# Patient Record
Sex: MALE | Race: ASIAN | Employment: FULL TIME | URBAN - METROPOLITAN AREA
[De-identification: names, ages, dates, MRNs, and addresses within clinical notes are randomized per-mention and may not be internally consistent; named-entity substitution may affect disease eponyms.]

---

## 2018-02-16 ENCOUNTER — OFFICE VISIT (OUTPATIENT)
Dept: PRIMARY CARE CLINIC | Age: 38
End: 2018-02-16
Payer: COMMERCIAL

## 2018-02-16 VITALS
DIASTOLIC BLOOD PRESSURE: 70 MMHG | BODY MASS INDEX: 22.22 KG/M2 | TEMPERATURE: 102.8 F | RESPIRATION RATE: 14 BRPM | HEART RATE: 104 BPM | OXYGEN SATURATION: 94 % | HEIGHT: 69 IN | WEIGHT: 150 LBS | SYSTOLIC BLOOD PRESSURE: 116 MMHG

## 2018-02-16 DIAGNOSIS — R68.89 FLU-LIKE SYMPTOMS: ICD-10-CM

## 2018-02-16 DIAGNOSIS — J01.00 ACUTE NON-RECURRENT MAXILLARY SINUSITIS: Primary | ICD-10-CM

## 2018-02-16 LAB
INFLUENZA A ANTIBODY: POSITIVE
INFLUENZA B ANTIBODY: NEGATIVE

## 2018-02-16 PROCEDURE — 99214 OFFICE O/P EST MOD 30 MIN: CPT | Performed by: INTERNAL MEDICINE

## 2018-02-16 PROCEDURE — 87804 INFLUENZA ASSAY W/OPTIC: CPT | Performed by: INTERNAL MEDICINE

## 2018-02-16 RX ORDER — OSELTAMIVIR PHOSPHATE 75 MG/1
75 CAPSULE ORAL 2 TIMES DAILY
Qty: 10 CAPSULE | Refills: 0 | Status: SHIPPED | OUTPATIENT
Start: 2018-02-16 | End: 2018-02-21

## 2018-02-16 RX ORDER — AZITHROMYCIN 250 MG/1
TABLET, FILM COATED ORAL
Qty: 1 PACKET | Refills: 0 | Status: SHIPPED | OUTPATIENT
Start: 2018-02-16 | End: 2019-12-20 | Stop reason: ALTCHOICE

## 2018-02-16 ASSESSMENT — ENCOUNTER SYMPTOMS
EYE REDNESS: 0
BACK PAIN: 0
EYE DISCHARGE: 0
TROUBLE SWALLOWING: 0

## 2018-02-16 ASSESSMENT — PATIENT HEALTH QUESTIONNAIRE - PHQ9
2. FEELING DOWN, DEPRESSED OR HOPELESS: 0
SUM OF ALL RESPONSES TO PHQ9 QUESTIONS 1 & 2: 0
SUM OF ALL RESPONSES TO PHQ QUESTIONS 1-9: 0
1. LITTLE INTEREST OR PLEASURE IN DOING THINGS: 0

## 2018-02-16 NOTE — PROGRESS NOTES
[facility] PHYSICIANS  [facility] INTERNAL MEDICINE  [address]  [address] [facility] [address]  Dept: [phone]  Dept Fax: [phone]    Isabel Gao is a 40 y.o. male who presents today for his medical conditions/complaints as noted below. Isabel Gao is c/o of   Chief Complaint   Patient presents with    URI     X2 days. Flu test ordred    Fatigue     x2 days    Fever     x2 days    Sinusitis    Cough    Influenza     + A   -B    SICK VISIT   He is here for acute onset sickness    HPI:     HPI   No PCP in past     No chronic medical problems. Possible sick contact in recent time at office   Pt c/o sinusitis symptoms like sinus pressure , runny nose since last 2 days  associated with  mild fever/chills , sore throat , cough , ear pain , headache . Tried OTC meds with partial relief . Nyquil     Flu test positive for influenza A         BP Readings from Last 3 Encounters:   02/16/18 116/70          (goal 120/80)    History reviewed. No pertinent past medical history. History reviewed. No pertinent surgical history. History reviewed. No pertinent family history. Social History   Substance Use Topics    Smoking status: Never Smoker    Smokeless tobacco: Never Used    Alcohol use No      Current Outpatient Prescriptions   Medication Sig Dispense Refill    azithromycin (ZITHROMAX) 250 MG tablet Take 2 tabs (500 mg) on Day 1, and take 1 tab (250 mg) on days 2 through 5. 1 packet 0    oseltamivir (TAMIFLU) 75 MG capsule Take 1 capsule by mouth 2 times daily for 10 doses 10 capsule 0     No current facility-administered medications for this visit.       No Known Allergies    Health Maintenance   Topic Date Due    HIV screen  08/17/1996    Flu vaccine (1) 11/01/2018 (Originally 9/1/2017)    DTaP/Tdap/Td vaccine (1 - Tdap) 02/26/2019 (Originally 8/17/2000)       Subjective:      Review of Systems   Constitutional: Negative for activity change, appetite change and unexpected weight change. HENT: Negative for trouble swallowing. Eyes: Negative for discharge and redness. Cardiovascular: Negative for palpitations. Endocrine: Negative for cold intolerance and heat intolerance. Genitourinary: Negative for difficulty urinating. Musculoskeletal: Negative for back pain. Skin: Negative for wound. Neurological: Negative for dizziness. Hematological: Negative for adenopathy. Psychiatric/Behavioral: Negative for behavioral problems. The patient is not nervous/anxious. Objective:     Physical Exam   Constitutional: He is oriented to person, place, and time. He appears well-developed and well-nourished. No distress. HENT:   Head: Normocephalic and atraumatic. Right Ear: External ear normal.   Left Ear: External ear normal.   Nose: Nose normal.   Mouth/Throat: Oropharynx is clear and moist. No oropharyngeal exudate. Maxillary sinus tenderness +   Eyes: Conjunctivae are normal. Right eye exhibits no discharge. Left eye exhibits no discharge. No scleral icterus. Neck: Neck supple. Cardiovascular: Normal rate, regular rhythm and normal heart sounds. No murmur heard. Pulmonary/Chest: Effort normal and breath sounds normal. No respiratory distress. He has no wheezes. Abdominal: Soft. Bowel sounds are normal. He exhibits no distension. There is no tenderness. Musculoskeletal: He exhibits no edema or tenderness. Lymphadenopathy:     He has no cervical adenopathy. Neurological: He is alert and oriented to person, place, and time. Skin: Skin is warm and dry. No rash noted. He is not diaphoretic. Psychiatric: Judgment and thought content normal.   Nursing note and vitals reviewed. /70 (Site: Right Arm, Position: Sitting, Cuff Size: Medium Adult)   Pulse 104   Temp 102.8 °F (39.3 °C)   Resp 14   Ht 5' 9\" (1.753 m)   Wt 150 lb (68 kg)   SpO2 94%   BMI 22.15 kg/m²     Assessment:      1.  Acute non-recurrent maxillary sinusitis  azithromycin (ZITHROMAX)

## 2019-12-20 ENCOUNTER — HOSPITAL ENCOUNTER (OUTPATIENT)
Age: 39
Setting detail: SPECIMEN
Discharge: HOME | End: 2019-12-20
Payer: COMMERCIAL

## 2019-12-20 ENCOUNTER — OFFICE VISIT (OUTPATIENT)
Dept: PRIMARY CARE CLINIC | Age: 39
End: 2019-12-20
Payer: COMMERCIAL

## 2019-12-20 VITALS
OXYGEN SATURATION: 98 % | HEIGHT: 69 IN | HEART RATE: 64 BPM | BODY MASS INDEX: 24.35 KG/M2 | RESPIRATION RATE: 16 BRPM | DIASTOLIC BLOOD PRESSURE: 70 MMHG | WEIGHT: 164.4 LBS | SYSTOLIC BLOOD PRESSURE: 118 MMHG

## 2019-12-20 DIAGNOSIS — Z23 ENCOUNTER FOR IMMUNIZATION: ICD-10-CM

## 2019-12-20 DIAGNOSIS — Z13.6 ENCOUNTER FOR LIPID SCREENING FOR CARDIOVASCULAR DISEASE: ICD-10-CM

## 2019-12-20 DIAGNOSIS — Z13.220 ENCOUNTER FOR LIPID SCREENING FOR CARDIOVASCULAR DISEASE: ICD-10-CM

## 2019-12-20 DIAGNOSIS — H61.21 IMPACTED CERUMEN OF RIGHT EAR: ICD-10-CM

## 2019-12-20 DIAGNOSIS — Z13.1 DIABETES MELLITUS SCREENING: ICD-10-CM

## 2019-12-20 DIAGNOSIS — Z00.00 HEALTHY ADULT ON ROUTINE PHYSICAL EXAMINATION: Primary | ICD-10-CM

## 2019-12-20 LAB
CHOLESTEROL, FASTING: 141 MG/DL
CHOLESTEROL/HDL RATIO: 4.9
GLUCOSE FASTING: 92 MG/DL (ref 70–99)
HDLC SERPL-MCNC: 29 MG/DL
LDL CHOLESTEROL: 93 MG/DL (ref 0–130)
TRIGLYCERIDE, FASTING: 97 MG/DL
VLDLC SERPL CALC-MCNC: ABNORMAL MG/DL (ref 1–30)

## 2019-12-20 PROCEDURE — 90471 IMMUNIZATION ADMIN: CPT | Performed by: NURSE PRACTITIONER

## 2019-12-20 PROCEDURE — 69210 REMOVE IMPACTED EAR WAX UNI: CPT | Performed by: NURSE PRACTITIONER

## 2019-12-20 PROCEDURE — 90686 IIV4 VACC NO PRSV 0.5 ML IM: CPT | Performed by: NURSE PRACTITIONER

## 2019-12-20 PROCEDURE — 99395 PREV VISIT EST AGE 18-39: CPT | Performed by: NURSE PRACTITIONER

## 2019-12-20 ASSESSMENT — ENCOUNTER SYMPTOMS
EYE REDNESS: 0
COUGH: 0
DIARRHEA: 0
NAUSEA: 0
SHORTNESS OF BREATH: 0
CONSTIPATION: 0
BACK PAIN: 0
EYE PAIN: 0

## 2019-12-20 ASSESSMENT — PATIENT HEALTH QUESTIONNAIRE - PHQ9
SUM OF ALL RESPONSES TO PHQ QUESTIONS 1-9: 0
2. FEELING DOWN, DEPRESSED OR HOPELESS: 0
SUM OF ALL RESPONSES TO PHQ QUESTIONS 1-9: 0
SUM OF ALL RESPONSES TO PHQ9 QUESTIONS 1 & 2: 0
1. LITTLE INTEREST OR PLEASURE IN DOING THINGS: 0